# Patient Record
Sex: FEMALE | Race: OTHER | Employment: PART TIME | URBAN - METROPOLITAN AREA
[De-identification: names, ages, dates, MRNs, and addresses within clinical notes are randomized per-mention and may not be internally consistent; named-entity substitution may affect disease eponyms.]

---

## 2022-03-14 ENCOUNTER — OFFICE VISIT (OUTPATIENT)
Dept: FAMILY MEDICINE CLINIC | Age: 52
End: 2022-03-14
Payer: MEDICAID

## 2022-03-14 VITALS
HEART RATE: 74 BPM | BODY MASS INDEX: 26.81 KG/M2 | WEIGHT: 133 LBS | TEMPERATURE: 97.1 F | SYSTOLIC BLOOD PRESSURE: 141 MMHG | DIASTOLIC BLOOD PRESSURE: 86 MMHG | OXYGEN SATURATION: 99 % | RESPIRATION RATE: 17 BRPM | HEIGHT: 59 IN

## 2022-03-14 DIAGNOSIS — E66.3 OVERWEIGHT (BMI 25.0-29.9): ICD-10-CM

## 2022-03-14 DIAGNOSIS — N88.8 CERVICAL DISCHARGE: ICD-10-CM

## 2022-03-14 DIAGNOSIS — Z00.01 ENCOUNTER FOR WELL ADULT EXAM WITH ABNORMAL FINDINGS: ICD-10-CM

## 2022-03-14 DIAGNOSIS — Z76.89 ESTABLISHING CARE WITH NEW DOCTOR, ENCOUNTER FOR: ICD-10-CM

## 2022-03-14 DIAGNOSIS — Z12.11 COLON CANCER SCREENING: ICD-10-CM

## 2022-03-14 DIAGNOSIS — Z23 ENCOUNTER FOR IMMUNIZATION: ICD-10-CM

## 2022-03-14 DIAGNOSIS — Z12.39 ENCOUNTER FOR BREAST CANCER SCREENING USING NON-MAMMOGRAM MODALITY: ICD-10-CM

## 2022-03-14 DIAGNOSIS — Z00.01 ENCOUNTER FOR WELL ADULT EXAM WITH ABNORMAL FINDINGS: Primary | ICD-10-CM

## 2022-03-14 DIAGNOSIS — I10 ELEVATED BLOOD PRESSURE READING IN OFFICE WITH DIAGNOSIS OF HYPERTENSION: ICD-10-CM

## 2022-03-14 LAB
ANION GAP SERPL CALC-SCNC: 4 MMOL/L (ref 5–15)
BUN SERPL-MCNC: 15 MG/DL (ref 6–20)
BUN/CREAT SERPL: 25 (ref 12–20)
CALCIUM SERPL-MCNC: 9.4 MG/DL (ref 8.5–10.1)
CHLORIDE SERPL-SCNC: 106 MMOL/L (ref 97–108)
CHOLEST SERPL-MCNC: 217 MG/DL
CO2 SERPL-SCNC: 30 MMOL/L (ref 21–32)
CREAT SERPL-MCNC: 0.59 MG/DL (ref 0.55–1.02)
ERYTHROCYTE [DISTWIDTH] IN BLOOD BY AUTOMATED COUNT: 12.6 % (ref 11.5–14.5)
EST. AVERAGE GLUCOSE BLD GHB EST-MCNC: 105 MG/DL
GLUCOSE SERPL-MCNC: 91 MG/DL (ref 65–100)
HBA1C MFR BLD: 5.3 % (ref 4–5.6)
HCT VFR BLD AUTO: 40.7 % (ref 35–47)
HDLC SERPL-MCNC: 75 MG/DL
HDLC SERPL: 2.9 {RATIO} (ref 0–5)
HGB BLD-MCNC: 13.1 G/DL (ref 11.5–16)
LDLC SERPL CALC-MCNC: 129.2 MG/DL (ref 0–100)
MCH RBC QN AUTO: 29.2 PG (ref 26–34)
MCHC RBC AUTO-ENTMCNC: 32.2 G/DL (ref 30–36.5)
MCV RBC AUTO: 90.6 FL (ref 80–99)
NRBC # BLD: 0 K/UL (ref 0–0.01)
NRBC BLD-RTO: 0 PER 100 WBC
PLATELET # BLD AUTO: 163 K/UL (ref 150–400)
PMV BLD AUTO: 9.8 FL (ref 8.9–12.9)
POTASSIUM SERPL-SCNC: 3.8 MMOL/L (ref 3.5–5.1)
RBC # BLD AUTO: 4.49 M/UL (ref 3.8–5.2)
SODIUM SERPL-SCNC: 140 MMOL/L (ref 136–145)
TRIGL SERPL-MCNC: 64 MG/DL (ref ?–150)
VLDLC SERPL CALC-MCNC: 12.8 MG/DL
WBC # BLD AUTO: 4.5 K/UL (ref 3.6–11)
WET MOUNT POCT, WMPOCT: NORMAL

## 2022-03-14 PROCEDURE — 99203 OFFICE O/P NEW LOW 30 MIN: CPT | Performed by: STUDENT IN AN ORGANIZED HEALTH CARE EDUCATION/TRAINING PROGRAM

## 2022-03-14 PROCEDURE — 90750 HZV VACC RECOMBINANT IM: CPT | Performed by: STUDENT IN AN ORGANIZED HEALTH CARE EDUCATION/TRAINING PROGRAM

## 2022-03-14 PROCEDURE — 99386 PREV VISIT NEW AGE 40-64: CPT | Performed by: STUDENT IN AN ORGANIZED HEALTH CARE EDUCATION/TRAINING PROGRAM

## 2022-03-14 PROCEDURE — 87210 SMEAR WET MOUNT SALINE/INK: CPT | Performed by: STUDENT IN AN ORGANIZED HEALTH CARE EDUCATION/TRAINING PROGRAM

## 2022-03-14 RX ORDER — LORATADINE 10 MG/1
10 TABLET ORAL
COMMUNITY

## 2022-03-14 NOTE — PROGRESS NOTES
Subjective: Ann Israel is an 47 y.o. female who presents for complete physical exam and to establish care. Doing well. No complaints. Diet: does not follow any specific diet. She tries eating a balance diet with fruits and vegetables. Exercise: walks twice per week. Works at a nail salon. Has been living in the [facility] for 32 years. Immunizations - reviewed:   Immunization History   Administered Date(s) Administered    Zoster Recombinant 2022     Tdap: declined    Health Maintenance reviewed -  PAP smear: never done  Mammography: never  Colonoscopy: never   HIV testing: declined   Hepatitis C testing: declined    Gyn:     She is sexually active with monogamous partner. Denies any previous history of sexually transmitted infections. LMP: 1 to 2 years ago. No postmenopausal bleeding. Allergies - reviewed:   No Known Allergies      Medications - reviewed:  Current Outpatient Medications   Medication Sig    loratadine (Claritin) 10 mg tablet Take 10 mg by mouth. No current facility-administered medications for this visit.        Past Medical History - reviewed:  Past Medical History:   Diagnosis Date    Allergy     HA (headache)      Past Surgical History - reviewed:  Past Surgical History:   Procedure Laterality Date    HX  SECTION       Family History - reviewed:  Family History   Problem Relation Age of Onset    No Known Problems Mother     Cancer Father         liver    Heart Disease Sister     No Known Problems Brother     No Known Problems Maternal Grandmother     No Known Problems Maternal Grandfather     No Known Problems Paternal Grandmother     No Known Problems Paternal Grandfather        Social History - reviewed:  Social History     Socioeconomic History    Marital status:      Spouse name: Not on file    Number of children: Not on file    Years of education: Not on file    Highest education level: Not on file   Occupational History  Not on file   Tobacco Use    Smoking status: Never Smoker    Smokeless tobacco: Never Used   Substance and Sexual Activity    Alcohol use: Never    Drug use: Never    Sexual activity: Not on file   Other Topics Concern    Not on file   Social History Narrative    Not on file     Social Determinants of Health     Financial Resource Strain:     Difficulty of Paying Living Expenses: Not on file   Food Insecurity:     Worried About Running Out of Food in the Last Year: Not on file    Amanda of Food in the Last Year: Not on file   Transportation Needs:     Lack of Transportation (Medical): Not on file    Lack of Transportation (Non-Medical): Not on file   Physical Activity:     Days of Exercise per Week: Not on file    Minutes of Exercise per Session: Not on file   Stress:     Feeling of Stress : Not on file   Social Connections:     Frequency of Communication with Friends and Family: Not on file    Frequency of Social Gatherings with Friends and Family: Not on file    Attends Islam Services: Not on file    Active Member of [employer] Group or Organizations: Not on file    Attends Club or Organization Meetings: Not on file    Marital Status: Not on file   Intimate Partner Violence:     Fear of Current or Ex-Partner: Not on file    Emotionally Abused: Not on file    Physically Abused: Not on file    Sexually Abused: Not on file   Housing Stability:     Unable to Pay for Housing in the Last Year: Not on file    Number of [place] in the Last Year: Not on file    Unstable Housing in the Last Year: Not on file       Review of Systems   Constitutional: Negative for chills and fever. Respiratory: Negative for shortness of breath. Cardiovascular: Negative for chest pain. Gastrointestinal: Negative for abdominal pain, blood in stool, constipation, diarrhea, nausea and vomiting. Genitourinary: Negative for dysuria and hematuria. Neurological: Positive for headaches (mild, not frequent). Negative for weakness. Psychiatric/Behavioral: Negative for depression. The patient is not nervous/anxious and does not have insomnia. Has some mild lingering cough since December after being diagnosed with covid. This is not very bothersome and it has improved. She takes Nyquil prn which helps her sxs. Headaches: infrequent, not throbbing or severe, usually bilateral and frontal. Resolve with Tylenol       Objective:     Visit Vitals  BP (!) 141/86   Pulse 74   Temp 97.1 °F (36.2 °C) (Temporal)   Resp 17   Ht 4' 11\" (1.499 m)   Wt 133 lb (60.3 kg)   SpO2 99%   BMI 26.86 kg/m²       General appearance - alert, well appearing, and in no distress  Eyes - sclera anicteric and without injection   Mouth - mucous membranes moist, pharynx normal without lesions  Neck - supple, no significant adenopathy, thyroid exam: thyroid is normal in size without nodules or tenderness  Chest - clear to auscultation, no wheezes, rales or rhonchi, symmetric air entry  Heart - normal rate, regular rhythm, normal S1, S2, no murmurs, rubs, clicks or gallops  Abdomen - soft, nontender, nondistended, no masses or organomegaly  Neurological - alert, oriented, normal speech, no focal findings or movement disorder noted  Musculoskeletal - no joint tenderness, deformity or swelling  Extremities - no pedal edema noted  Skin - normal coloration and turgor, no rashes, no suspicious skin lesions noted      Physical Exam  Exam conducted with a chaperone present Alex Baker). Genitourinary:     Pubic Area: No rash. Labia:         Right: No rash. Vagina: Normal.      Cervix: Discharge (scan, white loose discharge. Not purulent. ) present. No friability, cervical bleeding or eversion. Uterus: Normal.       Adnexa: Right adnexa normal and left adnexa normal.        Right: No tenderness. Left: No tenderness. Comments: Cervical Ectropion         Assessment/ Plan:       ICD-10-CM ICD-9-CM    1.  Encounter for PROVIDER:[TOKEN:[[id number]]] well adult exam with abnormal findings  Z00.01 V70.0 PAP IG, APTIMA HPV AND RFX 16/18,45 ([id number])      METABOLIC PANEL, BASIC      CBC W/O DIFF   2. Elevated blood pressure reading in office with diagnosis of hypertension  I10 401.9 LIPID PANEL    No personal or family hx of HTN. Advice checking BP at home. Encourage weight loss and regular exercise. 3. Cervical discharge  N88.8 623.5 AMB POC SMEAR, STAIN & INTERPRET, WET MOUNT   4. Overweight (BMI 25.0-29. 9)  E66.3 278.02 LIPID PANEL      HEMOGLOBIN A1C WITH EAG    Recommended regular exercise and weight loss goal of 5%. 6.5 lbs   5. Encounter for breast cancer screening using non-mammogram modality  Z12.39 V76.10 CAITLIN MAMMO BI SCREENING INCL CAD   6. Encounter for immunization  Z23 V03.89 ZOSTER VACC RECOMBINANT ADJUVANTED    RTC in 2 months for 2nd dose of Shingles   7. Establishing care with new doctor, encounter for  Z76.89 V65.8    8. Colon cancer screening  Z12.11 V76.51 REFERRAL TO GASTROENTEROLOGY     Cervical Discharge:   Noted cervical discharge on speculum exam. KOH and wet prep were normal. Patient denies any excessive vaginal discharge. Pelvic exam was otherwise unremarkable. Patient declined STI testing of any sort. Elevated pressure w/w dx of HTN:  checked x 2, sister with ?heart disease unclear diagnosis. - Will advice of monitoring BP outpatient  - Obtain blood pressure cuff  - Check BP for 2 weeks, reach out if readings are >130/90 at home. - Will check lipid panel to calculate ascvd risk. I have discussed the diagnosis with the patient and the intended plan as seen in the above orders. The patient has received an after-visit summary and questions were answered concerning future plans. I have discussed medication side effects and warnings with the patient as well. Informed pt to return to the office if new symptoms arise.     Patient discussed with Angelita Mora (attending)     Paola Rosenberg MD  Family Medicine Resident

## 2022-03-14 NOTE — PATIENT INSTRUCTIONS
Home Blood Pressure Test: About This Test  What is it? A home blood pressure test allows you to keep track of your blood pressure at home. Blood pressure is a measure of the force of blood against the walls of your arteries. Blood pressure readings include two numbers, such as 130/80 (say \"130 over 80\"). The first number is the systolic pressure. The second number is the diastolic pressure. Why is this test done? You may do this test at home to:  · Find out if you have high blood pressure. · Track your blood pressure if you have high blood pressure. · Track how well medicine is working to reduce high blood pressure. · Check how lifestyle changes, such as weight loss and exercise, are affecting blood pressure. How do you prepare for the test?  For at least 30 minutes before you take your blood pressure, don't exercise, drink caffeine, or smoke. Empty your bladder before the test. Sit quietly with your back straight and both feet on the floor for at least 5 minutes. This helps you take your blood pressure while you feel comfortable and relaxed. How is the test done? · If your doctor recommends it, take your blood pressure twice a day. Take it in the morning and evening. · Sit with your arm slightly bent and resting on a table so that your upper arm is at the same level as your heart. · Use the same arm each time you take your blood pressure. · Place the blood pressure cuff on the bare skin of your upper arm. You may have to roll up your sleeve, remove your arm from the sleeve, or take your shirt off. · Wrap the blood pressure cuff around your upper arm so that the lower edge of the cuff is about 1 inch above the bend of your elbow. · Do not move, talk, or text while you take your blood pressure. Follow the instructions that came with your blood pressure monitor. They might be different from the following. · Press the on/off button on the automatic monitor.  Then you may need to wait until the screen says the monitor is ready. · Press the start button. The cuff will inflate and deflate by itself. · Your blood pressure numbers will appear on the screen. · Wait one minute and take your blood pressure again. · If your monitor does not automatically save your numbers, write them in your log book, along with the date and time. Follow-up care is a key part of your treatment and safety. Be sure to make and go to all appointments, and call your doctor if you are having problems. It's also a good idea to keep a list of the medicines you take. Where can you learn more? Go to http://www.mendez.com/  Enter C427 in the search box to learn more about \"Home Blood Pressure Test: About This Test.\"  Current as of: January 10, 2022               Content Version: 13.2  © 3542-7793 [employer]. Care instructions adapted under license by [employer] (which disclaims liability or warranty for this information). If you have questions about a medical condition or this instruction, always ask your healthcare professional. [address] any warranty or liability for your use of this information. T?ng Ney?t Áp: H???ng D?n Ch?m Sóc - [ Elevated Blood Pressure: Care Instructions ]  H??ng D?n Ch?m Sóc    Ney?t áp là m?t th??c ?o l?c máu ép vào thành ? ?ng m?ch c?a quý v?. Vi?c ney?t áp t?ng lên và gi?m whitman?ng uma manriquez?t c? ngày là bình th??ng. Nh?ng n?u ney?t áp gi? ? m?c sampson, quý v? b? sampson ney?t áp. Marcelina s? cho quý v? bi?t ney?t áp c?a mình. S? ??u tiên là áp manriquez?t tâm thu. Nó cho th?y l?c ép c?a máu khi zi quý v? b?m máu. S? th? marcelina là ney?t áp tâm tr??ng. Nó cho th?y l?c ép c?a máu gi? a các nh?p zi, khi zi quý v? dãn ra và ??y máu. Ney?t áp lý t??ng cho ng??i l?n là d??i 120/80 (g?i là \"120 trên 80\"). Sampson ney?t áp là 140/90 ho?c sampson h?n.  Quý v? b? sampson ney?t áp n?u s? trên c?a quý v? là 140 ho?c sampson h?n ho?c s? d??i c?a quý v? là 90 ho?c sampson h?n, ho?c c? marcelina tr??ng h?p.  Ki?m tra chính cho b?nh keatign ney?t áp ? ?n gi?n, nhanh, và không ?au. ?? ch?n ?oán keating ney?t áp, bác s? s? ?o ney?t áp c?a quý v? t?i các th?i ?i?m khác nhau. Quý v? có th? ph?i ?o ney?t áp t?i nhà n?u có lý do ?? tin r?ng k?t qu? t?i phòng khám c?a bác s? là không chính xác. N?u quý v? b? ch?n ?oán có b?nh keating ney?t áp, quý v? có th? làm vi?c v?i bác s? c?a mình ? ? xây d?ng m?t k? ho?ch dài h?n ?? ki?m soát b?nh. Ch?m sóc sherman dõi là m?t ph?n maddie tr?ng cho vi?c ?i?u tr? và s? an toàn c?a quý v?. ??m b?o s?p x?p và ??n t?t c? các bu?i h?n và g?i ?i?n cho bác s? n?u quý v? có v?n ?? Claudean Heman v? c?ng nên bi?t k?t qu? xét vanessa?m c?a mình và gi? l?i vitaliy sách các lo?i thu?c mà quý v? dùng. Quý v? có th? ch?m sóc b?n thân t?i nhà th? nào? · Không hút thu?c. Hút thu?c làm t?ng nguy c? b? ?au zi và ? ?t qu? . N?u quý v? c?n giúp b? thu?c, hãy nói dao?n v?i bác s? c?a quý v? v? các lo?i thu?c và ch? ?ng trình b? thu?c. Nh?ng cách này có th? giúp quý v? d? b? h?n thu?c h?n.  · Brennan trì m?c cân n?ng kh?e m?nh.  · C? g?ng h?n ch? l??ng natri quý v? ?n whitman?ng d??i 2.300 milligram (mg) m?t ngày. Bác s? có th? yêu c?u quý v? c? g?ng ?n ít h?n 1.500 mg m?t ngày. · Hãy n?ng ??ng. Dành ít nh?t 30 phút t?p th? d?c h?u h?t các ngày uma tu?n. ? i b? Cheryl Rivera ch?n thích h?p. Quý v? c?ng có th? th?c hi?n hình th?c v?n ??ng khác, nh? ch?y b?, b?i l?i, ?i xe ??p ho?c ch?i qu?n v?t hay các môn th? arianne ??ng ??i.  · Tránh ho?c h?n ch? u?ng r??u. Nói dao?n v?i bác s? xem quý v? có ???c u?ng r? ?u hay không. · ?n jewel?u trái cây, kelli c?, và các s?n ph?m t? s?a ít béo. ?n ít ch?t béo bão hòa và t?ng s? ch?t béo. · H?c cách ?o ney?t áp t?i nhà. Khi nào quý v? nên g?i tr? giúp? G?i bác s? c?a quý v? ngay ho?c tìm n?i ch?m sóc y t? ngay n?u:  · Ney?t áp keating h?n jewel?u so v?i bình th??ng (nh? 180/110 ho?c keating h?n). · Quý v? ngh? ney?t áp keating ?ang gây ra các tri?u ch?ng nh?:  ¨ ?au ??u d? d?i.  ¨ M? m?t.   Sherman dõi k? h?n các thay ??i v? s?c kh?e và nh? liên l?c v?i bác s? n?u:  · Quý v? không kh?e lên nh? diogenes ??i. Quý v? có th? tìm hi?u thêm ? ?âu? Vào [email or web address].[employer]/. Enter F010 tìm hi?u thêm uma hô?p ti?m kiê?m \"T?ng Ney?t Áp: H???ng D?n Ch?m Sóc - [ Elevated Blood Pressure: Care Instructions ]. \"  [employer] hi?u l?c k? t?: [address] Jhonathan?ng T? 2017  Phiên b?n n?i zaki.4  © 8554-2022 [employer]. H??ng d?n ch?m sóc ? ??c s?a ??i cho phù h?p emanuel gi?y phép c?a chuyên anita ch?m sóc y t?. N?u quý v? có th??c m??c v? các ?i?u ki?n y t? ho?c h??ng d?n này, olesya vui lòng h?i chuyên anita ch?m so?c y t?. Tâ?p ?oa?n [employer]wise kh??c t? m?i ??m b?o ho?c trách jewel?m v? vi?c s? d?ng thông tin na?y.

## 2022-03-16 ENCOUNTER — TELEPHONE (OUTPATIENT)
Dept: FAMILY MEDICINE CLINIC | Age: 52
End: 2022-03-16

## 2022-03-16 NOTE — PROGRESS NOTES
Lipid panel: TC: 217, T, HDL: 75, LDL: 129, no statin recommended  CBC/ BMP ok  A1C: 5.3 %     Attempted to call patient with [facility]. No reply and no VM available. Will send letter with results.

## 2022-03-18 PROBLEM — N88.8 CERVICAL DISCHARGE: Status: ACTIVE | Noted: 2022-03-14

## 2022-03-19 PROBLEM — E66.3 OVERWEIGHT (BMI 25.0-29.9): Status: ACTIVE | Noted: 2022-03-14

## 2022-03-21 LAB
CYTOLOGIST CVX/VAG CYTO: NORMAL
CYTOLOGY CVX/VAG DOC CYTO: NORMAL
CYTOLOGY CVX/VAG DOC THIN PREP: NORMAL
HPV I/H RISK 4 DNA CVX QL PROBE+SIG AMP: NEGATIVE
Lab: NORMAL
Lab: NORMAL
OTHER STN SPEC: NORMAL
STAT OF ADQ CVX/VAG CYTO-IMP: NORMAL

## 2022-05-18 ENCOUNTER — CLINICAL SUPPORT (OUTPATIENT)
Dept: FAMILY MEDICINE CLINIC | Age: 52
End: 2022-05-18
Payer: COMMERCIAL

## 2022-05-18 DIAGNOSIS — Z23 ENCOUNTER FOR IMMUNIZATION: Primary | ICD-10-CM

## 2022-05-19 ENCOUNTER — TELEPHONE (OUTPATIENT)
Dept: FAMILY MEDICINE CLINIC | Age: 52
End: 2022-05-19

## 2022-05-19 NOTE — TELEPHONE ENCOUNTER
I called pt to schedule pap smear appt per Dr Leonel Sánchez. The pt will give us a call back to schedule appt. The pt currently have a cold and want to wait to schedule.

## 2022-05-19 NOTE — TELEPHONE ENCOUNTER
Discussed pap smear results over the phone. Patient needs a repeat Pap smear. She is aware. Will sent [facility] message to help schedule.

## 2022-06-08 PROCEDURE — 90750 HZV VACC RECOMBINANT IM: CPT | Performed by: FAMILY MEDICINE

## 2022-06-08 NOTE — PROGRESS NOTES
Chief Complaint   Patient presents with    Immunization/Injection     1. Have you been to the ER, urgent care clinic since your last visit? Hospitalized since your last visit? No    2. Have you seen or consulted any other health care providers outside of the [address] [place] [address] since your last visit? Include any pap smears or colon screening.  No

## 2023-07-10 ENCOUNTER — OFFICE VISIT (OUTPATIENT)
Age: 53
End: 2023-07-10
Payer: MEDICAID

## 2023-07-10 VITALS
TEMPERATURE: 98.3 F | WEIGHT: 132 LBS | OXYGEN SATURATION: 96 % | HEART RATE: 71 BPM | RESPIRATION RATE: 18 BRPM | HEIGHT: 59 IN | SYSTOLIC BLOOD PRESSURE: 114 MMHG | DIASTOLIC BLOOD PRESSURE: 68 MMHG | BODY MASS INDEX: 26.61 KG/M2

## 2023-07-10 DIAGNOSIS — Z12.39 ENCOUNTER FOR OTHER SCREENING FOR MALIGNANT NEOPLASM OF BREAST: ICD-10-CM

## 2023-07-10 DIAGNOSIS — Z00.00 ENCOUNTER FOR ANNUAL GENERAL MEDICAL EXAMINATION WITHOUT ABNORMAL FINDINGS IN ADULT: Primary | ICD-10-CM

## 2023-07-10 DIAGNOSIS — Z12.11 ENCOUNTER FOR SCREENING FOR MALIGNANT NEOPLASM OF COLON: ICD-10-CM

## 2023-07-10 DIAGNOSIS — R51.9 NONINTRACTABLE EPISODIC HEADACHE, UNSPECIFIED HEADACHE TYPE: ICD-10-CM

## 2023-07-10 PROCEDURE — 99396 PREV VISIT EST AGE 40-64: CPT

## 2023-07-10 SDOH — ECONOMIC STABILITY: INCOME INSECURITY: HOW HARD IS IT FOR YOU TO PAY FOR THE VERY BASICS LIKE FOOD, HOUSING, MEDICAL CARE, AND HEATING?: NOT VERY HARD

## 2023-07-10 SDOH — ECONOMIC STABILITY: HOUSING INSECURITY
IN THE LAST 12 MONTHS, WAS THERE A TIME WHEN YOU DID NOT HAVE A STEADY PLACE TO SLEEP OR SLEPT IN A SHELTER (INCLUDING NOW)?: NO

## 2023-07-10 SDOH — ECONOMIC STABILITY: FOOD INSECURITY: WITHIN THE PAST 12 MONTHS, THE FOOD YOU BOUGHT JUST DIDN'T LAST AND YOU DIDN'T HAVE MONEY TO GET MORE.: NEVER TRUE

## 2023-07-10 SDOH — ECONOMIC STABILITY: FOOD INSECURITY: WITHIN THE PAST 12 MONTHS, YOU WORRIED THAT YOUR FOOD WOULD RUN OUT BEFORE YOU GOT MONEY TO BUY MORE.: NEVER TRUE

## 2023-07-10 ASSESSMENT — ENCOUNTER SYMPTOMS
SHORTNESS OF BREATH: 0
WHEEZING: 0
ALLERGIC/IMMUNOLOGIC NEGATIVE: 1
VOMITING: 0
COUGH: 0
PHOTOPHOBIA: 0
NAUSEA: 0
TROUBLE SWALLOWING: 0

## 2023-07-10 ASSESSMENT — PATIENT HEALTH QUESTIONNAIRE - PHQ9
SUM OF ALL RESPONSES TO PHQ QUESTIONS 1-9: 0
2. FEELING DOWN, DEPRESSED OR HOPELESS: 0
1. LITTLE INTEREST OR PLEASURE IN DOING THINGS: 0
SUM OF ALL RESPONSES TO PHQ QUESTIONS 1-9: 0
SUM OF ALL RESPONSES TO PHQ QUESTIONS 1-9: 0
SUM OF ALL RESPONSES TO PHQ9 QUESTIONS 1 & 2: 0
SUM OF ALL RESPONSES TO PHQ QUESTIONS 1-9: 0

## 2023-07-10 NOTE — PROGRESS NOTES
Room #17  #[id number]  Patient has been identified by name and . Chief Complaint   Patient presents with    Headache     Headache since last week, top of the head on & off.        Vitals:    07/10/23 0805   Weight: 132 lb (59.9 kg)   Height: 4' 11\" (1.499 m)        1. Have you been to the ER, urgent care clinic since your last visit? Hospitalized since your last visit? No    2. Have you seen or consulted any other health care providers outside of the [address] [place] [address] since your last visit? Include any pap smears or colon screening.  No

## 2023-07-11 LAB
BUN SERPL-MCNC: 12 MG/DL (ref 6–24)
BUN/CREAT SERPL: 18 (ref 9–23)
CALCIUM SERPL-MCNC: 9.2 MG/DL (ref 8.7–10.2)
CHLORIDE SERPL-SCNC: 104 MMOL/L (ref 96–106)
CHOLEST SERPL-MCNC: 171 MG/DL (ref 100–199)
CO2 SERPL-SCNC: 26 MMOL/L (ref 20–29)
CREAT SERPL-MCNC: 0.65 MG/DL (ref 0.57–1)
EGFRCR SERPLBLD CKD-EPI 2021: 105 ML/MIN/1.73
ERYTHROCYTE [DISTWIDTH] IN BLOOD BY AUTOMATED COUNT: 12.5 % (ref 11.7–15.4)
GLUCOSE SERPL-MCNC: 106 MG/DL (ref 70–99)
HBA1C MFR BLD: 5.3 % (ref 4.8–5.6)
HCT VFR BLD AUTO: 41.1 % (ref 34–46.6)
HDLC SERPL-MCNC: 71 MG/DL
HGB BLD-MCNC: 13.7 G/DL (ref 11.1–15.9)
IMP & REVIEW OF LAB RESULTS: NORMAL
LDLC SERPL CALC-MCNC: 79 MG/DL (ref 0–99)
MCH RBC QN AUTO: 29.6 PG (ref 26.6–33)
MCHC RBC AUTO-ENTMCNC: 33.3 G/DL (ref 31.5–35.7)
MCV RBC AUTO: 89 FL (ref 79–97)
PLATELET # BLD AUTO: 159 X10E3/UL (ref 150–450)
POTASSIUM SERPL-SCNC: 3.8 MMOL/L (ref 3.5–5.2)
RBC # BLD AUTO: 4.63 X10E6/UL (ref 3.77–5.28)
SODIUM SERPL-SCNC: 141 MMOL/L (ref 134–144)
TRIGL SERPL-MCNC: 120 MG/DL (ref 0–149)
VLDLC SERPL CALC-MCNC: 21 MG/DL (ref 5–40)
WBC # BLD AUTO: 3.8 X10E3/UL (ref 3.4–10.8)